# Patient Record
Sex: FEMALE
[De-identification: names, ages, dates, MRNs, and addresses within clinical notes are randomized per-mention and may not be internally consistent; named-entity substitution may affect disease eponyms.]

---

## 2024-06-04 PROBLEM — Z00.00 ENCOUNTER FOR PREVENTIVE HEALTH EXAMINATION: Status: ACTIVE | Noted: 2024-06-04

## 2024-07-26 ENCOUNTER — NON-APPOINTMENT (OUTPATIENT)
Age: 39
End: 2024-07-26

## 2024-08-13 ENCOUNTER — NON-APPOINTMENT (OUTPATIENT)
Age: 39
End: 2024-08-13

## 2024-08-13 ENCOUNTER — APPOINTMENT (OUTPATIENT)
Dept: COLORECTAL SURGERY | Facility: CLINIC | Age: 39
End: 2024-08-13
Payer: COMMERCIAL

## 2024-08-13 DIAGNOSIS — Z93.2 ILEOSTOMY STATUS: ICD-10-CM

## 2024-08-13 PROCEDURE — 99204 OFFICE O/P NEW MOD 45 MIN: CPT

## 2024-08-14 ENCOUNTER — NON-APPOINTMENT (OUTPATIENT)
Age: 39
End: 2024-08-14

## 2024-08-15 ENCOUNTER — NON-APPOINTMENT (OUTPATIENT)
Age: 39
End: 2024-08-15

## 2024-08-18 NOTE — PHYSICAL EXAM
[Abdomen Masses] : No abdominal masses [Abdomen Tenderness] : ~T No ~M abdominal tenderness [Alert] : alert [Oriented to Person] : oriented to person [Oriented to Place] : oriented to place [Calm] : calm [Oriented to Time] : oriented to time [de-identified] : Ileostomy right lower quadrant functioning properly [de-identified] : Looks well in no distress, of stated age. [de-identified] : Pupils equal reactive to light normocephalic atraumatic.

## 2024-08-18 NOTE — ASSESSMENT
[FreeTextEntry1] : 38-year-old female with complicated surgical history including total colectomy complicated by multiple reoperations due to complications now With ileostomy currently.  I have had a lengthy discussion with the patient and the mother at this moment in time recommend evaluation of the rectal stump in terms of length and quality possibility of reversal of ileostomy with ileorectal anastomosis with a temporary ileostomy to allow the anastomosis to heal properly the other option would be completion proctectomy with ileoanal J-pouch procedure with temporary ileostomy as well.  Risk and benefits of the surgery have been discussed which include bleeding, infection, sepsis, multiorgan failure, inadvertent injury including hollow viscus, solid organ, ureter neurovascular and neurological structures, DVT PE, heart attack, stroke, hernias, recurrence, worsening of symptoms, leakage of anastomosis require reoperation possible stoma and death. I have also given her the name of Dr.Feza Williamson and wished for her to see him for possible J-pouch.

## 2024-09-30 ENCOUNTER — APPOINTMENT (OUTPATIENT)
Dept: COLORECTAL SURGERY | Facility: CLINIC | Age: 39
End: 2024-09-30
Payer: COMMERCIAL

## 2024-09-30 VITALS
HEART RATE: 63 BPM | SYSTOLIC BLOOD PRESSURE: 115 MMHG | BODY MASS INDEX: 21.71 KG/M2 | DIASTOLIC BLOOD PRESSURE: 74 MMHG | WEIGHT: 115 LBS | OXYGEN SATURATION: 100 % | RESPIRATION RATE: 12 BRPM | HEIGHT: 61 IN

## 2024-09-30 DIAGNOSIS — Z93.2 ILEOSTOMY STATUS: ICD-10-CM

## 2024-09-30 PROCEDURE — 99212 OFFICE O/P EST SF 10 MIN: CPT

## 2024-09-30 NOTE — PHYSICAL EXAM
[No Rash or Lesion] : No rash or lesion [Alert] : alert [Oriented to Person] : oriented to person [Oriented to Place] : oriented to place [Oriented to Time] : oriented to time [Anxious] : anxious [de-identified] : +stoma in RLQ, transverse suprapubic incision [de-identified] : WDWARACELI [de-identified] : ANICTERIC

## 2024-09-30 NOTE — HISTORY OF PRESENT ILLNESS
[FreeTextEntry1] : Referred by Dr. 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 Of leak versus stricture requiring ileostomy at this time. Patient has had a very complicated surgical history with multiple operations and complications. Patient complains of significant pain in the lower left part of her abdomen she states that this is due to nerve entrapment  She states that she "hates" her ileostomy. She state that her health problem began with rectal prolapse.   Never has had dx of IBD  Jaya Miles, Dr. ROSALVA Garcia  03/31/22 - sx to address rectal prolapse, had resection with rectopexy lost ability to pass stool through anastomosis 05/27/22 - subtotal colectomy and end ileostomy 1 week later, c/b rectal sheath hematoma and required sx on 06/01/22. This became infected and was hospitalized x 1 month.  She thinks she has "5cm of rectum left"  Has become "completely disabled". She states that she has pain  EI is functioning.  Surgical Hx -cholecystectomy (teenager) -rectopexy (March 2022)

## 2024-09-30 NOTE — PHYSICAL EXAM
[No Rash or Lesion] : No rash or lesion [Alert] : alert [Oriented to Person] : oriented to person [Oriented to Place] : oriented to place [Oriented to Time] : oriented to time [Anxious] : anxious [de-identified] : +stoma in RLQ, transverse suprapubic incision [de-identified] : WDWARACELI [de-identified] : ANICTERIC

## 2024-09-30 NOTE — ASSESSMENT
[FreeTextEntry1] : concern for malrotation  MRI pelvis EUA/flix sig Plan for ex-lap, FLOYD eastmanratna Spent close to 40 minutes time in the presence of her mother with her very complicated story with the initial laparoscopy for rectopexy with resection, followed by total/subtotal total proctocolectomy with end ileostomy with no reconnection and her primary concern is for a reconnection right now.  I also told her and went through the literature this is likely flipping a coin for these things to be successful in the setting of her motility.  Likely considering the fact that she has only 6 cm of the rectum left behind she is going to need pouch rectal or a pouch anal anastomosis diverting loop ileostomy.  Success rates again 50-50 in the settings when it is not UC or FAP.  She has proven the fact that she has a normal anal manometry, so I do not need to get a new one at the same time before we closed the ileostomy we have to repeat the enema now manometry to see what that shows.  Once again, I spent a good amount of time, and she is very keen on proceeding with the procedure where she will go to  a date for the initial evaluation with an MRI of the pelvis and a flexible sigmoidoscopy followed by the J-pouch procedure if she is interested.

## 2024-09-30 NOTE — HISTORY OF PRESENT ILLNESS
[FreeTextEntry1] : Referred by Dr. 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 Of leak versus stricture requiring ileostomy at this time. Patient has had a very complicated surgical history with multiple operations and complications. Patient complains of significant pain in the lower left part of her abdomen she states that this is due to nerve entrapment  She states that she "hates" her ileostomy. She state that her health problem began with rectal prolapse.   Never has had dx of IBD  Jaya Miles, Dr. ROSALVA Garcia  03/31/22 - sx to address rectal prolapse, had resection with rectopexy lost ability to pass stool through anastomosis 05/27/22 - subtotal colectomy and end ileostomy 1 week later, c/b rectal sheath hematoma and required sx on 06/01/22. This became infected and was hospitalized x 1 month.  She thinks she has "5cm of rectum left"  Has become "completely disabled". She states that she has pain  EI is functioning.  Surgical Hx -cholecystectomy (teenager) -rectopexy (March 2022)

## 2024-10-21 ENCOUNTER — APPOINTMENT (OUTPATIENT)
Dept: MRI IMAGING | Facility: CLINIC | Age: 39
End: 2024-10-21
Payer: COMMERCIAL

## 2024-10-21 ENCOUNTER — OUTPATIENT (OUTPATIENT)
Dept: OUTPATIENT SERVICES | Facility: HOSPITAL | Age: 39
LOS: 1 days | End: 2024-10-21
Payer: COMMERCIAL

## 2024-10-21 PROCEDURE — 72197 MRI PELVIS W/O & W/DYE: CPT | Mod: 26

## 2024-10-21 PROCEDURE — A9585: CPT

## 2024-10-21 PROCEDURE — 72197 MRI PELVIS W/O & W/DYE: CPT

## 2024-11-18 ENCOUNTER — APPOINTMENT (OUTPATIENT)
Dept: COLORECTAL SURGERY | Facility: CLINIC | Age: 39
End: 2024-11-18
Payer: COMMERCIAL

## 2024-11-18 VITALS
HEART RATE: 87 BPM | WEIGHT: 115 LBS | OXYGEN SATURATION: 100 % | HEIGHT: 61 IN | DIASTOLIC BLOOD PRESSURE: 73 MMHG | SYSTOLIC BLOOD PRESSURE: 111 MMHG | BODY MASS INDEX: 21.71 KG/M2

## 2024-11-18 DIAGNOSIS — Z93.2 ILEOSTOMY STATUS: ICD-10-CM

## 2024-11-18 PROCEDURE — 99214 OFFICE O/P EST MOD 30 MIN: CPT

## 2024-11-25 NOTE — H&P PST ADULT - NSICDXPROCEDURE_GEN_ALL_CORE_FT
PROCEDURES:  Exam under anesthesia, anus 25-Nov-2024 12:11:20  Kira Lane  Flexible sigmoidoscopy 25-Nov-2024 12:12:03  Kira Lane

## 2024-11-25 NOTE — H&P PST ADULT - ASSESSMENT
11/18/2024  The patient is a 38 y/o F with hx of total colectomy with STONE in March 2022 and became unable to pass through stool through anastomosis. She underwent surgery for EI in May 2022 who is here for pre-op for EUA/flex sig  I spent close to 40 minutes with her and setting the expectation. This is unfortunate so with a problem that we do not have a medical or a true surgical solution. The surgical solution can be released of solving her problem. At the same time we have a young lady who has an ileostomy that potentially could be permanent and she is she is seeking for expert pouch surgeon since the length of the rectum is pretty short. As what I said to her and to her mother in the earlier meeting is a fair option with the unknown success rates. In 1 way that I said it is like tossing up a coin. She needs to understand the fact that this the priority.  ?  She has seen some doctor at [place] and we are getting sidetracked that with some unrealistic requests or expectations about the new right bundle pathology and vascular pain around the stoma which with humility and empathy I tried to draw the expectation once again. All these problems may continue after my surgery and the primary goal of the surgery is not fixing these issues because there is not a clear medical or surgical problem to fix his issues as the way it has been portrayed to her. I read the evaluation in extent in the presence of our physician assistant.  ?  At the end this is going to be her decision whether she would like to proceed or not and we may not be able to fix her pain issues and the issues that put her into the same position of the ileostomy may come back again. Risk benefits morbidity setting the expectations are understanding the primary goal of the surgery was explained in the presence of mother and I allowed myself to be recorded to her mother's phone. Will proceed with the initial exam under anesthesia and evaluation and proceed accordingly if she is interested for the J-pouch procedure in the long run. I spent over 30 minutes discussing this with the patient and her mother.  Abdominal exam unremarkable.  -day before procedure: Pt may eat normally up until lunch. Pt should drink clear liquids after lunch and up until midnight.  -pt should have manometry testing and GGE before an ileostomy closure  -Risks, Benefits, plan and alternatives discussed and they consent.

## 2024-11-25 NOTE — H&P PST ADULT - HISTORY OF PRESENT ILLNESS
Referred by Dr. Miguel Leija  ?  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 Of leak versus stricture requiring ileostomy at this time. Patient has had a very complicated surgical history with multiple operations and complications. Patient complains of significant pain in the lower left part of her abdomen she states that this is due to nerve entrapment  ?  She states that she "hates" her ileostomy. She state that her health problem began with rectal prolapse.  ?  Never has had dx of IBD  ?  Jaya Miles, Dr. ROSALVA Garcia  ?  03/31/22 - sx to address rectal prolapse, had resection with rectopexy  lost ability to pass stool through anastomosis  05/27/22 - subtotal colectomy and end ileostomy  1 week later, c/b rectal sheath hematoma and required sx on 06/01/22. This became infected and was hospitalized x 1 month.  ?  She thinks she has "5cm of rectum left"  ?  Has become "completely disabled". She states that she has pain  ?  EI is functioning.  ?  Surgical Hx  -cholecystectomy (teenager)  -rectopexy (March 2022)  ?  11/18/2024  Here today with her mother for pre-op for EUA/flex sig scheduled for 11/26/24.  Ostomy is functioning  Denies fever, cough, chest pain, nausea, vomiting  ?

## 2024-11-25 NOTE — H&P PST ADULT - ATTENDING COMMENTS
Patient seen and examined. No change from H&P. All risks and benefits explained to the patient and the patient consents to the procedure.    Malina Williamson

## 2024-11-26 ENCOUNTER — APPOINTMENT (OUTPATIENT)
Dept: COLORECTAL SURGERY | Facility: HOSPITAL | Age: 39
End: 2024-11-26

## 2024-11-26 ENCOUNTER — TRANSCRIPTION ENCOUNTER (OUTPATIENT)
Age: 39
End: 2024-11-26

## 2024-11-26 ENCOUNTER — OUTPATIENT (OUTPATIENT)
Dept: OUTPATIENT SERVICES | Facility: HOSPITAL | Age: 39
LOS: 1 days | End: 2024-11-26
Payer: COMMERCIAL

## 2024-11-26 VITALS
DIASTOLIC BLOOD PRESSURE: 535 MMHG | SYSTOLIC BLOOD PRESSURE: 97 MMHG | OXYGEN SATURATION: 100 % | HEART RATE: 69 BPM | RESPIRATION RATE: 16 BRPM

## 2024-11-26 VITALS
SYSTOLIC BLOOD PRESSURE: 114 MMHG | HEART RATE: 70 BPM | RESPIRATION RATE: 16 BRPM | OXYGEN SATURATION: 99 % | WEIGHT: 115.08 LBS | HEIGHT: 61 IN | TEMPERATURE: 98 F | DIASTOLIC BLOOD PRESSURE: 73 MMHG

## 2024-11-26 DIAGNOSIS — Z98.890 OTHER SPECIFIED POSTPROCEDURAL STATES: Chronic | ICD-10-CM

## 2024-11-26 LAB — HCG UR QL: NEGATIVE — SIGNIFICANT CHANGE UP

## 2024-11-26 PROCEDURE — 46600 DIAGNOSTIC ANOSCOPY SPX: CPT

## 2024-11-26 PROCEDURE — 45330 DIAGNOSTIC SIGMOIDOSCOPY: CPT

## 2024-11-26 PROCEDURE — 81025 URINE PREGNANCY TEST: CPT

## 2024-11-26 PROCEDURE — 57420 EXAM OF VAGINA W/SCOPE: CPT

## 2024-11-26 RX ORDER — 0.9 % SODIUM CHLORIDE 0.9 %
1000 INTRAVENOUS SOLUTION INTRAVENOUS ONCE
Refills: 0 | Status: COMPLETED | OUTPATIENT
Start: 2024-11-26 | End: 2024-11-26

## 2024-11-26 RX ORDER — ONDANSETRON HYDROCHLORIDE 4 MG/1
4 TABLET, FILM COATED ORAL ONCE
Refills: 0 | Status: DISCONTINUED | OUTPATIENT
Start: 2024-11-26 | End: 2024-11-26

## 2024-11-26 RX ORDER — SODIUM CHLORIDE 9 MG/ML
3 INJECTION, SOLUTION INTRAMUSCULAR; INTRAVENOUS; SUBCUTANEOUS EVERY 8 HOURS
Refills: 0 | Status: DISCONTINUED | OUTPATIENT
Start: 2024-11-26 | End: 2024-11-26

## 2024-11-26 RX ORDER — LORATADINE 10 MG/1
1 TABLET ORAL
Refills: 0 | DISCHARGE

## 2024-11-26 RX ADMIN — Medication 1000 MILLILITER(S): at 18:05

## 2024-11-26 NOTE — ASU DISCHARGE PLAN (ADULT/PEDIATRIC) - MEDICATION INSTRUCTIONS
If needed: You may take over the counter tylenol as needed for pain. Do not exceed 4000mg tylenol/acetaminophen in 24 hours. Follow dosing instructions on medication bottle.

## 2024-11-26 NOTE — BRIEF OPERATIVE NOTE - OPERATION/FINDINGS
Exam under anesthesia, flexible sigmoidoscopy Exam under anesthesia, flexible proctoscopy, vaginoscopy, for pelvic floor disorder

## 2024-11-26 NOTE — ASU DISCHARGE PLAN (ADULT/PEDIATRIC) - CARE PROVIDER_API CALL
Malina Williamson  Colon/Rectal Surgery  [address] [place] [id number]  Phone: [phone]  Fax: [phone]  Follow Up Time: Routine

## 2024-11-26 NOTE — BRIEF OPERATIVE NOTE - NSICDXBRIEFPROCEDURE_GEN_ALL_CORE_FT
PROCEDURES:  Flexible proctoscopy 26-Nov-2024 17:09:37  Gasper Wall  Vaginoscopy 26-Nov-2024 17:09:47  Gasper Wall

## 2024-11-26 NOTE — ASU DISCHARGE PLAN (ADULT/PEDIATRIC) - FINANCIAL ASSISTANCE
[facility] provides services at a reduced cost to those who are determined to be eligible through [facility]’s financial assistance program. Information regarding [facility]’s financial assistance program can be found by going to [email or web address] or by calling [phone].

## 2024-11-26 NOTE — BRIEF OPERATIVE NOTE - NSICDXBRIEFPOSTOP_GEN_ALL_CORE_FT
POST-OP DIAGNOSIS:  Complication of repair of pelvic floor disorder 26-Nov-2024 17:10:31  Gasper Wall

## 2024-11-26 NOTE — ASU DISCHARGE PLAN (ADULT/PEDIATRIC) - PROCEDURE
exam under anesthesia, flexible sigmoidoscopy exam under anesthesia, flexible proctoscopy, vaginoscopy

## 2024-12-13 PROBLEM — Z98.890 OTHER SPECIFIED POSTPROCEDURAL STATES: Chronic | Status: ACTIVE | Noted: 2024-11-25

## 2025-01-06 ENCOUNTER — TRANSCRIPTION ENCOUNTER (OUTPATIENT)
Age: 40
End: 2025-01-06

## 2025-01-10 ENCOUNTER — APPOINTMENT (OUTPATIENT)
Dept: COLORECTAL SURGERY | Facility: CLINIC | Age: 40
End: 2025-01-10

## 2025-03-05 ENCOUNTER — APPOINTMENT (OUTPATIENT)
Dept: COLORECTAL SURGERY | Facility: CLINIC | Age: 40
End: 2025-03-05

## 2025-03-13 ENCOUNTER — NON-APPOINTMENT (OUTPATIENT)
Age: 40
End: 2025-03-13

## 2025-05-13 NOTE — HISTORY OF PRESENT ILLNESS
The patient's goals for the shift include feel better    The clinical goals for the shift include safety    Over the shift, the patient did not make progress toward the following goals. Barriers to progression include . Recommendations to address these barriers include .     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  Of leak versus stricture requiring ileostomy at this time.  Patient's had a very complicated surgical history with multiple operations and complications.  Patient complains of significant pain in the lower left part of her abdomen she states that this is due to nerve entrapment .  patient is inquiring about possible ileostomy reversal.

## (undated) DEVICE — VENODYNE/SCD SLEEVE CALF MEDIUM

## (undated) DEVICE — SOL IRR POUR H2O 250ML

## (undated) DEVICE — Device

## (undated) DEVICE — WARMING BLANKET UPPER ADULT

## (undated) DEVICE — GLV 8 PROTEXIS (WHITE)

## (undated) DEVICE — SOL IRR POUR NS 0.9% 500ML

## (undated) DEVICE — PACK LITHOTOMY

## (undated) DEVICE — DRAPE LEGGINGS XL

## (undated) DEVICE — ELCTR BOVIE PENCIL HANDPIECE ROCKER SWITCH 15FT

## (undated) DEVICE — TUBING CAP SET ERBEFLO CLEVERCAP HYBRID CO2 FOR OLYMPUS SCOPES AND UCR

## (undated) DEVICE — SUCTION YANKAUER OPEN TIP NO VENT CURVE

## (undated) DEVICE — GOWN TRIMAX XXL

## (undated) DEVICE — DRAPE TOWEL BLUE 17" X 24"

## (undated) DEVICE — POSITIONER FOAM EGG CRATE ULNAR 2PCS (PINK)